# Patient Record
Sex: FEMALE | Race: ASIAN | NOT HISPANIC OR LATINO
[De-identification: names, ages, dates, MRNs, and addresses within clinical notes are randomized per-mention and may not be internally consistent; named-entity substitution may affect disease eponyms.]

---

## 2018-03-10 ENCOUNTER — APPOINTMENT (OUTPATIENT)
Dept: CARDIOLOGY | Facility: CLINIC | Age: 78
End: 2018-03-10
Payer: MEDICARE

## 2018-03-10 VITALS
RESPIRATION RATE: 18 BRPM | BODY MASS INDEX: 20.13 KG/M2 | SYSTOLIC BLOOD PRESSURE: 145 MMHG | HEIGHT: 61.5 IN | HEART RATE: 66 BPM | DIASTOLIC BLOOD PRESSURE: 72 MMHG | WEIGHT: 108 LBS | OXYGEN SATURATION: 96 %

## 2018-03-10 DIAGNOSIS — E03.9 HYPOTHYROIDISM, UNSPECIFIED: ICD-10-CM

## 2018-03-10 DIAGNOSIS — K83.8 OTHER SPECIFIED DISEASES OF BILIARY TRACT: ICD-10-CM

## 2018-03-10 PROBLEM — Z00.00 ENCOUNTER FOR PREVENTIVE HEALTH EXAMINATION: Status: ACTIVE | Noted: 2018-03-10

## 2018-03-10 PROCEDURE — 99203 OFFICE O/P NEW LOW 30 MIN: CPT

## 2018-03-10 PROCEDURE — 93306 TTE W/DOPPLER COMPLETE: CPT

## 2019-01-22 PROBLEM — M81.0 OSTEOPOROSIS: Status: ACTIVE | Noted: 2019-01-22

## 2019-01-22 PROBLEM — K83.8 MASS OF BILE DUCT: Status: RESOLVED | Noted: 2019-01-22 | Resolved: 2019-01-22

## 2019-01-22 PROBLEM — E03.9 HYPOTHYROIDISM: Status: ACTIVE | Noted: 2019-01-22

## 2019-01-22 RX ORDER — LEVOTHYROXINE SODIUM 50 UG/1
50 TABLET ORAL
Refills: 0 | Status: ACTIVE | COMMUNITY

## 2019-01-22 RX ORDER — MEMANTINE HYDROCHLORIDE AND DONEPEZIL HYDROCHLORIDE 21; 10 MG/1; MG/1
CAPSULE ORAL
Refills: 0 | Status: ACTIVE | COMMUNITY

## 2019-01-22 RX ORDER — TERIPARATIDE 250 UG/ML
INJECTION, SOLUTION SUBCUTANEOUS
Refills: 0 | Status: ACTIVE | COMMUNITY

## 2019-01-22 RX ORDER — LOSARTAN POTASSIUM 50 MG/1
50 TABLET, FILM COATED ORAL
Refills: 0 | Status: ACTIVE | COMMUNITY

## 2019-01-22 RX ORDER — TRAZODONE HYDROCHLORIDE 50 MG/1
50 TABLET ORAL
Refills: 0 | Status: ACTIVE | COMMUNITY

## 2019-01-23 ENCOUNTER — APPOINTMENT (OUTPATIENT)
Dept: CARDIOLOGY | Facility: CLINIC | Age: 79
End: 2019-01-23
Payer: MEDICARE

## 2019-01-23 VITALS
OXYGEN SATURATION: 96 % | TEMPERATURE: 97.7 F | WEIGHT: 109 LBS | RESPIRATION RATE: 18 BRPM | HEART RATE: 82 BPM | SYSTOLIC BLOOD PRESSURE: 157 MMHG | DIASTOLIC BLOOD PRESSURE: 77 MMHG | BODY MASS INDEX: 20.26 KG/M2

## 2019-01-23 DIAGNOSIS — M81.0 AGE-RELATED OSTEOPOROSIS W/OUT CURRENT PATHOLOGICAL FRACTURE: ICD-10-CM

## 2019-01-23 PROCEDURE — 99214 OFFICE O/P EST MOD 30 MIN: CPT

## 2019-01-23 PROCEDURE — 93351 STRESS TTE COMPLETE: CPT

## 2019-01-23 PROCEDURE — 93325 DOPPLER ECHO COLOR FLOW MAPG: CPT

## 2019-01-23 PROCEDURE — 93320 DOPPLER ECHO COMPLETE: CPT

## 2019-04-06 NOTE — HISTORY OF PRESENT ILLNESS
[FreeTextEntry1] : 78 year-old female with HTN, aortic aneurysm (4.1 cm), hypothyroidism, osteoporosis presents for followup.  Patient was last seen on 3/10/18 for evaluation of aortic aneurysm.  Patient reports that for the last year she has been feeling fatigued, probably due to her back issues she was always lying down. She reports SOB on exertion.  She is coughing with phlegm.  Patient reports that she was diagnosed with pneumonia back in April 2018.

## 2019-04-06 NOTE — PHYSICAL EXAM
[General Appearance - Well Developed] : well developed [Normal Appearance] : normal appearance [Well Groomed] : well groomed [General Appearance - Well Nourished] : well nourished [No Deformities] : no deformities [General Appearance - In No Acute Distress] : no acute distress [Normal Conjunctiva] : the conjunctiva exhibited no abnormalities [Eyelids - No Xanthelasma] : the eyelids demonstrated no xanthelasmas [Normal Oral Mucosa] : normal oral mucosa [No Oral Pallor] : no oral pallor [No Oral Cyanosis] : no oral cyanosis [Normal Jugular Venous A Waves Present] : normal jugular venous A waves present [Normal Jugular Venous V Waves Present] : normal jugular venous V waves present [No Jugular Venous Mcwilliams A Waves] : no jugular venous mcwilliams A waves [Respiration, Rhythm And Depth] : normal respiratory rhythm and effort [Exaggerated Use Of Accessory Muscles For Inspiration] : no accessory muscle use [Auscultation Breath Sounds / Voice Sounds] : lungs were clear to auscultation bilaterally [Heart Rate And Rhythm] : heart rate and rhythm were normal [Heart Sounds] : normal S1 and S2 [Arterial Pulses Normal] : the arterial pulses were normal [Edema] : no peripheral edema present [Abdomen Soft] : soft [Abdomen Tenderness] : non-tender [Abdomen Mass (___ Cm)] : no abdominal mass palpated [Abnormal Walk] : normal gait [Gait - Sufficient For Exercise Testing] : the gait was sufficient for exercise testing [Nail Clubbing] : no clubbing of the fingernails [Cyanosis, Localized] : no localized cyanosis [Petechial Hemorrhages (___cm)] : no petechial hemorrhages [] : no ischemic changes [Oriented To Time, Place, And Person] : oriented to person, place, and time [Affect] : the affect was normal [Mood] : the mood was normal [No Anxiety] : not feeling anxious [FreeTextEntry1] : 2/6 ESME at apex

## 2019-04-08 ENCOUNTER — APPOINTMENT (OUTPATIENT)
Dept: CARDIOLOGY | Facility: CLINIC | Age: 79
End: 2019-04-08
Payer: MEDICARE

## 2019-04-08 VITALS
HEART RATE: 79 BPM | OXYGEN SATURATION: 96 % | WEIGHT: 114 LBS | BODY MASS INDEX: 21.19 KG/M2 | TEMPERATURE: 98.1 F | SYSTOLIC BLOOD PRESSURE: 131 MMHG | DIASTOLIC BLOOD PRESSURE: 76 MMHG | RESPIRATION RATE: 18 BRPM

## 2019-04-08 DIAGNOSIS — R07.89 OTHER CHEST PAIN: ICD-10-CM

## 2019-04-08 PROCEDURE — 99214 OFFICE O/P EST MOD 30 MIN: CPT

## 2019-04-09 NOTE — HISTORY OF PRESENT ILLNESS
[FreeTextEntry1] : Patient went to ER in [place] for CP and SOB caused by asthma. The reports from [place] showed calcification on the Aorta. Her EKG was normal. Patient's XRay showed some increased markings. Patient had asthma with cough for a few months and was slightly better on asthma medication in the past. Her cough and wheezing is better. She has LE edema, but her heart function is fine. \par \par 78 year-old female with HTN, aortic aneurysm (4.1 cm), hypothyroidism, osteoporosis presents for followup.  Patient was last seen on 1/23/19.  Patient underwent a stress echo and completed 5.5 minutes of Lior protocol.  There were upsloping ST depressions on ECG but there was no echocardiographic evidence of ischemia.  I advised patient to have a CXR to rule out lung pathology. \par \par \par (1) SOB - Patient underwent a stress echo and completed 5.5 minutes of Lior protocol.  There were upsloping ST depressions on ECG but there was no echocardiographic evidence of ischemia.  Patient was reassured.  I advised patient to have a CXR to rule out lung pathology. \par \par (2) Aortic aneurysm - Patient was noted to have stable mild dilatation of the ascending aorta (4.1 cm) on echo.  Patient was reassured.  I advised patient to keep BP under good control.  Patient should have a followup echo in 1 year. \par \par (3) Followup - 1 year. \par \par 3/10/18 for evaluation of aortic aneurysm.  Patient reports that for the last year she has been feeling fatigued, probably due to her back issues she was always lying down. She reports SOB on exertion.  She is coughing with phlegm.  Patient reports that she was diagnosed with pneumonia back in April 2018.

## 2019-04-09 NOTE — DISCUSSION/SUMMARY
[FreeTextEntry1] : 78 year-old female with HTN, aortic aneurysm (4.1 cm), hypothyroidism, osteoporosis presents for followup.  Patient was last seen on 3/10/18 for evaluation of aortic aneurysm.  Patient reports that for the last year she has been feeling fatigued, probably due to her back issues she was always lying down. She reports SOB on exertion.  She is coughing with phlegm.  Patient reports that she was diagnosed with pneumonia back in April 2018.  \par \par (1) SOB - Patient underwent a stress echo and completed 5.5 minutes of Lior protocol.  There were upsloping ST depressions on ECG but there was no echocardiographic evidence of ischemia.  Patient was reassured.  I advised patient to have a CXR to rule out lung pathology. \par \par (2) Aortic aneurysm - Patient was noted to have stable mild dilatation of the ascending aorta (4.1 cm) on echo.  Patient was reassured.  I advised patient to keep BP under good control.  Patient should have a followup echo in 1 year. \par \par (3) Followup - 1 year.

## 2019-04-10 PROBLEM — R07.89 CHEST DISCOMFORT: Status: ACTIVE | Noted: 2019-04-10

## 2020-02-13 ENCOUNTER — APPOINTMENT (OUTPATIENT)
Dept: CARDIOLOGY | Facility: CLINIC | Age: 80
End: 2020-02-13
Payer: MEDICARE

## 2020-02-13 ENCOUNTER — NON-APPOINTMENT (OUTPATIENT)
Age: 80
End: 2020-02-13

## 2020-02-13 VITALS
SYSTOLIC BLOOD PRESSURE: 157 MMHG | HEART RATE: 63 BPM | OXYGEN SATURATION: 96 % | WEIGHT: 108 LBS | RESPIRATION RATE: 17 BRPM | TEMPERATURE: 98.1 F | BODY MASS INDEX: 20.08 KG/M2 | DIASTOLIC BLOOD PRESSURE: 84 MMHG

## 2020-02-13 DIAGNOSIS — R06.02 SHORTNESS OF BREATH: ICD-10-CM

## 2020-02-13 DIAGNOSIS — R73.9 HYPERGLYCEMIA, UNSPECIFIED: ICD-10-CM

## 2020-02-13 PROCEDURE — 93306 TTE W/DOPPLER COMPLETE: CPT

## 2020-02-13 PROCEDURE — 93000 ELECTROCARDIOGRAM COMPLETE: CPT | Mod: 59

## 2020-02-13 PROCEDURE — 93015 CV STRESS TEST SUPVJ I&R: CPT

## 2020-02-13 PROCEDURE — 99214 OFFICE O/P EST MOD 30 MIN: CPT | Mod: 25

## 2020-02-13 RX ORDER — SUCRALFATE 1 G/1
1 TABLET ORAL
Qty: 30 | Refills: 1 | Status: ACTIVE | COMMUNITY
Start: 2020-02-13 | End: 1900-01-01

## 2020-02-14 LAB
ESTIMATED AVERAGE GLUCOSE: 126 MG/DL
HBA1C MFR BLD HPLC: 6 %

## 2020-02-17 NOTE — PHYSICAL EXAM
[General Appearance - Well Developed] : well developed [Normal Appearance] : normal appearance [Well Groomed] : well groomed [General Appearance - Well Nourished] : well nourished [No Deformities] : no deformities [General Appearance - In No Acute Distress] : no acute distress [Normal Conjunctiva] : the conjunctiva exhibited no abnormalities [Eyelids - No Xanthelasma] : the eyelids demonstrated no xanthelasmas [Normal Oral Mucosa] : normal oral mucosa [No Oral Pallor] : no oral pallor [No Oral Cyanosis] : no oral cyanosis [Normal Jugular Venous A Waves Present] : normal jugular venous A waves present [Normal Jugular Venous V Waves Present] : normal jugular venous V waves present [No Jugular Venous Mcwilliams A Waves] : no jugular venous mcwilliams A waves [Respiration, Rhythm And Depth] : normal respiratory rhythm and effort [Exaggerated Use Of Accessory Muscles For Inspiration] : no accessory muscle use [Auscultation Breath Sounds / Voice Sounds] : lungs were clear to auscultation bilaterally [Heart Rate And Rhythm] : heart rate and rhythm were normal [Heart Sounds] : normal S1 and S2 [Arterial Pulses Normal] : the arterial pulses were normal [Edema] : no peripheral edema present [FreeTextEntry1] : 2/6 ESME at apex  [Abdomen Soft] : soft [Abdomen Tenderness] : non-tender [Abdomen Mass (___ Cm)] : no abdominal mass palpated [Abnormal Walk] : normal gait [Gait - Sufficient For Exercise Testing] : the gait was sufficient for exercise testing [Nail Clubbing] : no clubbing of the fingernails [Cyanosis, Localized] : no localized cyanosis [Petechial Hemorrhages (___cm)] : no petechial hemorrhages [] : no ischemic changes [Oriented To Time, Place, And Person] : oriented to person, place, and time [Affect] : the affect was normal [Mood] : the mood was normal [No Anxiety] : not feeling anxious

## 2020-02-17 NOTE — REASON FOR VISIT
[FreeTextEntry1] : Patient reports weakness for the past year. possible depression? Patient reports epigastric pain at night and SOB. Patient denies dysphagia. I advised patient to undergo an echocardiogram and a treadmill stress test.

## 2020-08-13 ENCOUNTER — APPOINTMENT (OUTPATIENT)
Dept: CARDIOLOGY | Facility: CLINIC | Age: 80
End: 2020-08-13

## 2020-08-13 NOTE — PHYSICAL EXAM
[General Appearance - Well Developed] : well developed [Normal Appearance] : normal appearance [General Appearance - Well Nourished] : well nourished [Well Groomed] : well groomed [No Deformities] : no deformities [General Appearance - In No Acute Distress] : no acute distress [Eyelids - No Xanthelasma] : the eyelids demonstrated no xanthelasmas [Normal Conjunctiva] : the conjunctiva exhibited no abnormalities [No Oral Pallor] : no oral pallor [Normal Oral Mucosa] : normal oral mucosa [Normal Jugular Venous A Waves Present] : normal jugular venous A waves present [No Oral Cyanosis] : no oral cyanosis [No Jugular Venous Mcwilliams A Waves] : no jugular venous mcwilliams A waves [Normal Jugular Venous V Waves Present] : normal jugular venous V waves present [Exaggerated Use Of Accessory Muscles For Inspiration] : no accessory muscle use [Respiration, Rhythm And Depth] : normal respiratory rhythm and effort [Auscultation Breath Sounds / Voice Sounds] : lungs were clear to auscultation bilaterally [Heart Rate And Rhythm] : heart rate and rhythm were normal [Heart Sounds] : normal S1 and S2 [Edema] : no peripheral edema present [FreeTextEntry1] : 2/6 ESME at apex  [Arterial Pulses Normal] : the arterial pulses were normal [Abdomen Tenderness] : non-tender [Abdomen Soft] : soft [Abdomen Mass (___ Cm)] : no abdominal mass palpated [Abnormal Walk] : normal gait [Nail Clubbing] : no clubbing of the fingernails [Gait - Sufficient For Exercise Testing] : the gait was sufficient for exercise testing [Cyanosis, Localized] : no localized cyanosis [Petechial Hemorrhages (___cm)] : no petechial hemorrhages [] : no ischemic changes [Oriented To Time, Place, And Person] : oriented to person, place, and time [Mood] : the mood was normal [Affect] : the affect was normal [No Anxiety] : not feeling anxious

## 2020-08-13 NOTE — REASON FOR VISIT
[FreeTextEntry1] : 2-13-20  Patient reports weakness for the past year. possible depression? Patient reports epigastric pain at night and SOB. Patient denies dysphagia. I advised patient to undergo an echocardiogram and a treadmill stress test.  I advised patient to try Carafate.

## 2022-12-06 PROBLEM — I10 HTN (HYPERTENSION): Status: ACTIVE | Noted: 2019-01-22

## 2022-12-06 PROBLEM — I71.9 AORTIC ANEURYSM: Status: ACTIVE | Noted: 2018-03-10

## 2022-12-07 ENCOUNTER — NON-APPOINTMENT (OUTPATIENT)
Age: 82
End: 2022-12-07

## 2022-12-07 ENCOUNTER — APPOINTMENT (OUTPATIENT)
Dept: CARDIOLOGY | Facility: CLINIC | Age: 82
End: 2022-12-07

## 2022-12-07 VITALS
DIASTOLIC BLOOD PRESSURE: 85 MMHG | SYSTOLIC BLOOD PRESSURE: 173 MMHG | RESPIRATION RATE: 17 BRPM | TEMPERATURE: 98 F | HEART RATE: 64 BPM | WEIGHT: 110 LBS | BODY MASS INDEX: 20.45 KG/M2 | OXYGEN SATURATION: 97 %

## 2022-12-07 VITALS — SYSTOLIC BLOOD PRESSURE: 160 MMHG | DIASTOLIC BLOOD PRESSURE: 90 MMHG

## 2022-12-07 DIAGNOSIS — I10 ESSENTIAL (PRIMARY) HYPERTENSION: ICD-10-CM

## 2022-12-07 DIAGNOSIS — I71.9 AORTIC ANEURYSM OF UNSPECIFIED SITE, W/OUT RUPTURE: ICD-10-CM

## 2022-12-07 PROCEDURE — 93306 TTE W/DOPPLER COMPLETE: CPT

## 2022-12-07 PROCEDURE — 99214 OFFICE O/P EST MOD 30 MIN: CPT

## 2022-12-07 PROCEDURE — 93000 ELECTROCARDIOGRAM COMPLETE: CPT

## 2022-12-07 NOTE — HISTORY OF PRESENT ILLNESS
[FreeTextEntry1] : 12/7/22 - Pt reports decreased exercise tolerance during pandemic.Pt reports blurred vision and unbalanced gait w. long walk. She has hx of spinal hernia. Pt report diaphoresis at night. Pt denies dizziness/CP. Pt's BP ranges 110-140s/-.\par \par 2-13-20  Patient reports weakness for the past year. possible depression? Patient reports epigastric pain at night and SOB. Patient denies dysphagia. I advised patient to undergo an echocardiogram and a treadmill stress test.   Patient underwent an echocardiogram and it showed normal LV function, mild dilatation of the ascending aorta (4.2 cm), without significant valvular pathology. Patient underwent a treadmill stress test and completed 4.5 minutes of Lior protocol.  There were upsloping ST depressions on ECG but no symptoms.  Following treadmill stress, there was no echocardiographic evidence of ischemia.   I advised patient to try Carafate.\par \par 3/10/18 - Patient reports that for the last year she has been feeling fatigued, probably due to her back issues she was always lying down. She reports SOB on exertion. She is coughing with phlegm. Patient reports that she was diagnosed with pneumonia back in April 2018. \par

## 2022-12-07 NOTE — REASON FOR VISIT
[FreeTextEntry3] : JOSE MARTIN ARRINGTON [FreeTextEntry1] : 82 year-old female with HTN, aortic aneurysm (4.2 cm), hypothyroidism, osteoporosis presents for followup. \par \par Patient was last seen on 2/13/20 for  epigastric pain at night and SOB.   I advised patient to undergo an echocardiogram and a treadmill stress test.   Patient underwent an echocardiogram and it showed normal LV function, mild dilatation of the ascending aorta (4.2 cm), without significant valvular pathology. Patient underwent a treadmill stress test and completed 4.5 minutes of Lior protocol.  There were upsloping ST depressions on ECG but no symptoms.  Following treadmill stress, there was no echocardiographic evidence of ischemia.   I advised patient to try Carafate.\par \par \par